# Patient Record
Sex: MALE | Race: BLACK OR AFRICAN AMERICAN | NOT HISPANIC OR LATINO | Employment: STUDENT | URBAN - METROPOLITAN AREA
[De-identification: names, ages, dates, MRNs, and addresses within clinical notes are randomized per-mention and may not be internally consistent; named-entity substitution may affect disease eponyms.]

---

## 2024-03-19 ENCOUNTER — HOSPITAL ENCOUNTER (EMERGENCY)
Facility: HOSPITAL | Age: 19
Discharge: HOME | End: 2024-03-19
Attending: STUDENT IN AN ORGANIZED HEALTH CARE EDUCATION/TRAINING PROGRAM
Payer: COMMERCIAL

## 2024-03-19 VITALS
TEMPERATURE: 100 F | WEIGHT: 172 LBS | HEART RATE: 102 BPM | DIASTOLIC BLOOD PRESSURE: 76 MMHG | RESPIRATION RATE: 16 BRPM | HEIGHT: 71 IN | SYSTOLIC BLOOD PRESSURE: 133 MMHG | OXYGEN SATURATION: 99 % | BODY MASS INDEX: 24.08 KG/M2

## 2024-03-19 DIAGNOSIS — J36 PERITONSILLAR ABSCESS: Primary | ICD-10-CM

## 2024-03-19 DIAGNOSIS — U07.1 COVID-19: ICD-10-CM

## 2024-03-19 LAB
ALBUMIN SERPL BCP-MCNC: 4.1 G/DL (ref 3.5–5.2)
ALP SERPL-CCNC: 60 U/L (ref 55–135)
ALT SERPL W/O P-5'-P-CCNC: 19 U/L (ref 10–44)
ANION GAP SERPL CALC-SCNC: 12 MMOL/L (ref 8–16)
AST SERPL-CCNC: 21 U/L (ref 10–40)
BASOPHILS # BLD AUTO: 0.03 K/UL (ref 0–0.2)
BASOPHILS NFR BLD: 0.3 % (ref 0–1.9)
BILIRUB SERPL-MCNC: 1.6 MG/DL (ref 0.1–1)
BUN SERPL-MCNC: 11 MG/DL (ref 6–20)
BUN SERPL-MCNC: 12 MG/DL (ref 6–30)
CALCIUM SERPL-MCNC: 10.4 MG/DL (ref 8.7–10.5)
CHLORIDE SERPL-SCNC: 102 MMOL/L (ref 95–110)
CHLORIDE SERPL-SCNC: 105 MMOL/L (ref 95–110)
CO2 SERPL-SCNC: 24 MMOL/L (ref 23–29)
CREAT SERPL-MCNC: 1.2 MG/DL (ref 0.5–1.4)
CREAT SERPL-MCNC: 1.2 MG/DL (ref 0.5–1.4)
CRP SERPL-MCNC: 79 MG/L (ref 0–8.2)
DIFFERENTIAL METHOD BLD: ABNORMAL
EOSINOPHIL # BLD AUTO: 0.1 K/UL (ref 0–0.5)
EOSINOPHIL NFR BLD: 1 % (ref 0–8)
ERYTHROCYTE [DISTWIDTH] IN BLOOD BY AUTOMATED COUNT: 12 % (ref 11.5–14.5)
EST. GFR  (NO RACE VARIABLE): >60 ML/MIN/1.73 M^2
GLUCOSE SERPL-MCNC: 83 MG/DL (ref 70–110)
GLUCOSE SERPL-MCNC: 87 MG/DL (ref 70–110)
GROUP A STREP, MOLECULAR: POSITIVE
HCT VFR BLD AUTO: 47 % (ref 40–54)
HCT VFR BLD CALC: 49 %PCV (ref 36–54)
HCV AB SERPL QL IA: NORMAL
HGB BLD-MCNC: 15.4 G/DL (ref 14–18)
HIV 1+2 AB+HIV1 P24 AG SERPL QL IA: NORMAL
IMM GRANULOCYTES # BLD AUTO: 0.04 K/UL (ref 0–0.04)
IMM GRANULOCYTES NFR BLD AUTO: 0.3 % (ref 0–0.5)
LYMPHOCYTES # BLD AUTO: 1.8 K/UL (ref 1–4.8)
LYMPHOCYTES NFR BLD: 15.6 % (ref 18–48)
MCH RBC QN AUTO: 30.2 PG (ref 27–31)
MCHC RBC AUTO-ENTMCNC: 32.8 G/DL (ref 32–36)
MCV RBC AUTO: 92 FL (ref 82–98)
MONOCYTES # BLD AUTO: 1.1 K/UL (ref 0.3–1)
MONOCYTES NFR BLD: 9.5 % (ref 4–15)
NEUTROPHILS # BLD AUTO: 8.5 K/UL (ref 1.8–7.7)
NEUTROPHILS NFR BLD: 73.3 % (ref 38–73)
NRBC BLD-RTO: 0 /100 WBC
PLATELET # BLD AUTO: 289 K/UL (ref 150–450)
PMV BLD AUTO: 8.7 FL (ref 9.2–12.9)
POC IONIZED CALCIUM: 1.21 MMOL/L (ref 1.06–1.42)
POC TCO2 (MEASURED): 29 MMOL/L (ref 23–29)
POTASSIUM BLD-SCNC: 4.6 MMOL/L (ref 3.5–5.1)
POTASSIUM SERPL-SCNC: 4.7 MMOL/L (ref 3.5–5.1)
PROT SERPL-MCNC: 8.7 G/DL (ref 6–8.4)
RBC # BLD AUTO: 5.1 M/UL (ref 4.6–6.2)
SAMPLE: NORMAL
SARS-COV-2 RDRP RESP QL NAA+PROBE: POSITIVE
SODIUM BLD-SCNC: 140 MMOL/L (ref 136–145)
SODIUM SERPL-SCNC: 141 MMOL/L (ref 136–145)
WBC # BLD AUTO: 11.51 K/UL (ref 3.9–12.7)

## 2024-03-19 PROCEDURE — 96365 THER/PROPH/DIAG IV INF INIT: CPT

## 2024-03-19 PROCEDURE — 87651 STREP A DNA AMP PROBE: CPT | Performed by: PHYSICIAN ASSISTANT

## 2024-03-19 PROCEDURE — 85025 COMPLETE CBC W/AUTO DIFF WBC: CPT | Performed by: PHYSICIAN ASSISTANT

## 2024-03-19 PROCEDURE — 25500020 PHARM REV CODE 255: Performed by: STUDENT IN AN ORGANIZED HEALTH CARE EDUCATION/TRAINING PROGRAM

## 2024-03-19 PROCEDURE — 96375 TX/PRO/DX INJ NEW DRUG ADDON: CPT

## 2024-03-19 PROCEDURE — U0002 COVID-19 LAB TEST NON-CDC: HCPCS | Performed by: PHYSICIAN ASSISTANT

## 2024-03-19 PROCEDURE — 87389 HIV-1 AG W/HIV-1&-2 AB AG IA: CPT | Performed by: PHYSICIAN ASSISTANT

## 2024-03-19 PROCEDURE — 86140 C-REACTIVE PROTEIN: CPT | Performed by: PHYSICIAN ASSISTANT

## 2024-03-19 PROCEDURE — 80053 COMPREHEN METABOLIC PANEL: CPT | Performed by: PHYSICIAN ASSISTANT

## 2024-03-19 PROCEDURE — 25000003 PHARM REV CODE 250: Performed by: STUDENT IN AN ORGANIZED HEALTH CARE EDUCATION/TRAINING PROGRAM

## 2024-03-19 PROCEDURE — 42700 I&D ABSCESS PERITONSILLAR: CPT

## 2024-03-19 PROCEDURE — 86803 HEPATITIS C AB TEST: CPT | Performed by: PHYSICIAN ASSISTANT

## 2024-03-19 PROCEDURE — 63600175 PHARM REV CODE 636 W HCPCS: Mod: JZ,JG | Performed by: PHYSICIAN ASSISTANT

## 2024-03-19 PROCEDURE — 99285 EMERGENCY DEPT VISIT HI MDM: CPT | Mod: 25

## 2024-03-19 PROCEDURE — 25000003 PHARM REV CODE 250: Performed by: PHYSICIAN ASSISTANT

## 2024-03-19 RX ORDER — CLINDAMYCIN PHOSPHATE 600 MG/50ML
600 INJECTION, SOLUTION INTRAVENOUS
Status: COMPLETED | OUTPATIENT
Start: 2024-03-19 | End: 2024-03-19

## 2024-03-19 RX ORDER — DEXAMETHASONE SODIUM PHOSPHATE 4 MG/ML
12 INJECTION, SOLUTION INTRA-ARTICULAR; INTRALESIONAL; INTRAMUSCULAR; INTRAVENOUS; SOFT TISSUE
Status: COMPLETED | OUTPATIENT
Start: 2024-03-19 | End: 2024-03-19

## 2024-03-19 RX ORDER — ACETAMINOPHEN 500 MG
1000 TABLET ORAL
Status: COMPLETED | OUTPATIENT
Start: 2024-03-19 | End: 2024-03-19

## 2024-03-19 RX ORDER — KETOROLAC TROMETHAMINE 30 MG/ML
10 INJECTION, SOLUTION INTRAMUSCULAR; INTRAVENOUS
Status: COMPLETED | OUTPATIENT
Start: 2024-03-19 | End: 2024-03-19

## 2024-03-19 RX ORDER — IBUPROFEN 600 MG/1
600 TABLET ORAL EVERY 6 HOURS PRN
Qty: 20 TABLET | Refills: 0 | Status: SHIPPED | OUTPATIENT
Start: 2024-03-19 | End: 2024-03-29

## 2024-03-19 RX ORDER — CLINDAMYCIN HYDROCHLORIDE 300 MG/1
300 CAPSULE ORAL 3 TIMES DAILY
Qty: 30 CAPSULE | Refills: 0 | Status: SHIPPED | OUTPATIENT
Start: 2024-03-19 | End: 2024-03-29

## 2024-03-19 RX ADMIN — KETOROLAC TROMETHAMINE 10 MG: 30 INJECTION, SOLUTION INTRAMUSCULAR; INTRAVENOUS at 07:03

## 2024-03-19 RX ADMIN — DEXAMETHASONE SODIUM PHOSPHATE 12 MG: 4 INJECTION INTRA-ARTICULAR; INTRALESIONAL; INTRAMUSCULAR; INTRAVENOUS; SOFT TISSUE at 07:03

## 2024-03-19 RX ADMIN — CLINDAMYCIN IN 5 PERCENT DEXTROSE 600 MG: 12 INJECTION, SOLUTION INTRAVENOUS at 08:03

## 2024-03-19 RX ADMIN — IOHEXOL 75 ML: 350 INJECTION, SOLUTION INTRAVENOUS at 07:03

## 2024-03-19 RX ADMIN — TOPICAL ANESTHETIC: 200 SPRAY DENTAL; PERIODONTAL at 09:03

## 2024-03-19 RX ADMIN — ACETAMINOPHEN 1000 MG: 500 TABLET ORAL at 10:03

## 2024-03-19 NOTE — ED PROVIDER NOTES
Encounter Date: 3/19/2024       History     Chief Complaint   Patient presents with    Oral Swelling     Pt. Was sent by urgent care for swollen tonsils.     18-year-old male presents emergency department for sore throat.  Five days of sore throat is progressively worsening.  States the pain is worse in the left side.  Seen by PCP and advised to come to the ED for for I&D.  Voice is slightly muffled with mild trismus.  Reports mild night sweats.        Review of patient's allergies indicates:  No Known Allergies  Past Medical History:   Diagnosis Date    RAD (reactive airway disease)      Past Surgical History:   Procedure Laterality Date    ADENOIDECTOMY       No family history on file.  Social History     Tobacco Use    Smoking status: Never   Substance Use Topics    Alcohol use: No    Drug use: No     Review of Systems   Constitutional:  Negative for chills.   HENT:  Positive for sore throat and trouble swallowing.    Respiratory:  Negative for cough and shortness of breath.    Cardiovascular:  Negative for chest pain.   Gastrointestinal:  Negative for abdominal pain.   Genitourinary:  Negative for difficulty urinating.   Musculoskeletal: Negative.    Neurological:  Negative for weakness.   Psychiatric/Behavioral:  Negative for confusion.        Physical Exam     Initial Vitals [03/19/24 1712]   BP Pulse Resp Temp SpO2   120/77 99 20 99.5 °F (37.5 °C) 95 %      MAP       --         Physical Exam    Nursing note and vitals reviewed.  Constitutional: He appears well-developed and well-nourished.   HENT:   Head: Normocephalic and atraumatic.   Uvula deviated to the left.  Bilateral tonsillar swelling worse in the left.  No exudate.  Voice slightly muffled.  Mild trismus.  No tongue elevation or sublingual swelling.   Eyes: Conjunctivae are normal.   Neck: Neck supple.   Normal range of motion.  Cardiovascular:  Normal rate.           Pulmonary/Chest: No respiratory distress.   Abdominal: He exhibits no distension.    Musculoskeletal:         General: Normal range of motion.      Cervical back: Normal range of motion and neck supple.     Neurological: He is alert and oriented to person, place, and time. GCS score is 15. GCS eye subscore is 4. GCS verbal subscore is 5. GCS motor subscore is 6.   Skin: Skin is warm and dry. Capillary refill takes less than 2 seconds.         ED Course   Procedures  Labs Reviewed   GROUP A STREP, MOLECULAR - Abnormal; Notable for the following components:       Result Value    Group A Strep, Molecular Positive (*)     All other components within normal limits   CBC W/ AUTO DIFFERENTIAL - Abnormal; Notable for the following components:    MPV 8.7 (*)     Gran # (ANC) 8.5 (*)     Mono # 1.1 (*)     Gran % 73.3 (*)     Lymph % 15.6 (*)     All other components within normal limits   COMPREHENSIVE METABOLIC PANEL - Abnormal; Notable for the following components:    Total Protein 8.7 (*)     Total Bilirubin 1.6 (*)     All other components within normal limits   C-REACTIVE PROTEIN - Abnormal; Notable for the following components:    CRP 79.0 (*)     All other components within normal limits   SARS-COV-2 RNA AMPLIFICATION, QUAL - Abnormal; Notable for the following components:    SARS-CoV-2 RNA, Amplification, Qual Positive (*)     All other components within normal limits   HIV 1 / 2 ANTIBODY    Narrative:     Release to patient->Immediate   HEPATITIS C ANTIBODY    Narrative:     Release to patient->Immediate   ISTAT PROCEDURE   ISTAT CHEM8          Imaging Results               CT Soft Tissue Neck With Contrast (Final result)  Result time 03/19/24 20:09:10      Final result by Luis A Carlin MD (03/19/24 20:09:10)                   Impression:      Tonsillitis with associated left peritonsillar abscess as above.  ENT consultation recommended.    This report was flagged in Epic as abnormal.    Electronically signed by resident: Jah Myers  Date:    03/19/2024  Time:    19:41    Electronically signed  by: Luis A Carlin MD  Date:    03/19/2024  Time:    20:09               Narrative:    EXAMINATION:  CT SOFT TISSUE NECK WITH CONTRAST    CLINICAL HISTORY:  Neck abscess, deep tissue;    TECHNIQUE:  Low dose axial images as well as sagittal and coronal reconstructions were performed from the skull base to the clavicles following the intravenous administration of 75 mL of Omnipaque 350.    COMPARISON:  None    FINDINGS:  Skull Base and Brain (limited evaluation): No significant abnormality.    Sinuses and Mastoid Air Cells: Essentially clear.    Salivary Glands: Parotid and submandibular glands unremarkable.    Thyroid: Unremarkable.    Cervical Lymph Nodes: Prominent left cervical lymph nodes, likely reactive.    Pharynx/Larynx: Mild enlargement of the bilateral palatine tonsils, left more than right with some narrowing of the oropharynx.  Mild enlargement of the left adenoids and slight enlargement of the left more than right lingual tonsils.  Left peritonsillar abscess measuring 1.0 x 2.0 x 1.6 cm with at least 1 internal septation/loculation (series 2, image 51 and series 603, image 76).  Mild fat stranding in the left parapharyngeal space.  No retropharyngeal fluid or abscess.    Vasculature (limited evaluation): Vascular structures of the neck appear patent.  Closest distance between the peritonsillar abscess and left internal carotid artery measures 1.8 cm.    Upper Airways and Lungs: Trachea is midline and the proximal airways are patent.  Ill-defined ground-glass opacities in the right upper lobe, likely infectious or inflammatory.    Bones: No acute fracture suspicious osseous lesion.                                       Medications   benzocaine 20 % oral spray (has no administration in time range)   acetaminophen tablet 1,000 mg (has no administration in time range)   ketorolac injection 9.999 mg (9.999 mg Intravenous Given 3/19/24 1948)   dexAMETHasone injection 12 mg (12 mg Intravenous Given 3/19/24 1948)    clindamycin in D5W 600 mg/50 mL IVPB 600 mg (0 mg Intravenous Stopped 3/19/24 2040)   iohexoL (OMNIPAQUE 350) injection 75 mL (75 mLs Intravenous Given 3/19/24 1929)     Medical Decision Making  [age] old male who was referred to the ED for sore throat and concern for abscess.      Differential includes but not limited to PTA, retropharyngeal abscess, Champ's angina, pharyngitis, COVID-19    On exam he is mild muffled voice with minimal trismus and uvula deviation.  CT soft tissue neck was obtained which shows a small PTA.  Additionally his viral screening was positive for COVID-19 and his strep swab was positive for group a strep.  Given Decadron and clinda in the ED and ENT was consulted.  I and D at bedside with small amount of drainage per ENT and they recommend discharge home with clindamycin.  We discussed quarantine precautions for his COVID-19.  Return ED precautions given.    Amount and/or Complexity of Data Reviewed  Labs: ordered. Decision-making details documented in ED Course.  Radiology: ordered.    Risk  Prescription drug management.               ED Course as of 03/19/24 2234   Tue Mar 19, 2024   1915 Group A Strep, Molecular(!): Positive [HJ]   1915 WBC: 11.51  No leukocytosis [HJ]   1933 SARS-CoV-2 RNA, Amplification, Qual(!): Positive  Positive for COVID-19 [HJ]   1946 CRP(!): 79.0  Elevated.  No baseline for comparison [HJ]      ED Course User Index  [HJ] Carolina Franco PA-C                           Clinical Impression:  Final diagnoses:  [J36] Peritonsillar abscess (Primary)  [U07.1] COVID-19          ED Disposition Condition    Discharge Stable          ED Prescriptions       Medication Sig Dispense Start Date End Date Auth. Provider    clindamycin (CLEOCIN) 300 MG capsule Take 1 capsule (300 mg total) by mouth 3 (three) times daily. for 10 days 30 capsule 3/19/2024 3/29/2024 Carolina Franco PA-C    ibuprofen (ADVIL,MOTRIN) 600 MG tablet Take 1 tablet (600 mg total) by mouth every 6 (six) hours  as needed for Pain. 20 tablet 3/19/2024 3/29/2024 Carolina Franco PA-C          Follow-up Information       Follow up With Specialties Details Why Contact Info    Sourav Fletcher MD Otolaryngology Schedule an appointment as soon as possible for a visit in 2 month(s) To discuss tonsillectomy [address] [facility] [address]  [phone]               Carolina Franco PA-C  03/19/24 2237

## 2024-03-19 NOTE — ED TRIAGE NOTES
Luis A Vergara, a 18 y.o. male presents to the ED w/ complaint of swollen tonsils, difficulty swallowing and HA.    Triage note:  Chief Complaint   Patient presents with    Oral Swelling     Pt. Was sent by urgent care for swollen tonsils.     Review of patient's allergies indicates:  No Known Allergies  Past Medical History:   Diagnosis Date    RAD (reactive airway disease)

## 2024-03-20 NOTE — CONSULTS
[facility] - Emergency Dept  Otorhinolaryngology-Head & Neck Surgery  Consult Note    Patient Name: Luis A Vergara  MRN: [id number]  Code Status: No Order  Admission Date: 3/19/2024  Hospital Length of Stay: 0 days  Attending Physician: Octavia Hemphill MD  Primary Care Provider: Stephanie Reinoso MD    Patient information was obtained from patient, parent, and ER records.     Inpatient consult to ENT  Consult performed by: Wilmer Walden MD  Consult ordered by: Carolina Franco PA-C        Subjective:     Chief Complaint/Reason for Admission: PTA    History of Present Illness: Luis A Vergara is 18 y.o. male that presents with a sore throat. Symptoms began 4 days ago now the pain is moderate. The pain is primarily on (the) left side. Patient states fever is absent. There is a  complaint of trismus. The voice is muffled.  Other associated symptoms have included: none. Fluid intake is fair. All his symptoms have significantly improved with decadron and clinda. Pt does report prior similar infections in the past.       Medications:  Continuous Infusions:  Scheduled Meds:   acetaminophen  1,000 mg Oral ED 1 Time    benzocaine   Mouth/Throat Once     PRN Meds:     No current facility-administered medications on file prior to encounter.     No current outpatient medications on file prior to encounter.       Review of patient's allergies indicates:  No Known Allergies    Past Medical History:   Diagnosis Date    RAD (reactive airway disease)      Past Surgical History:   Procedure Laterality Date    ADENOIDECTOMY       Family History    None       Tobacco Use    Smoking status: Never    Smokeless tobacco: Not on file   Substance and Sexual Activity    Alcohol use: No    Drug use: No    Sexual activity: Not on file     Review of Systems as above  Objective:     Vital Signs (Most Recent):  Temp: (!) 100.4 °F (38 °C) (03/19/24 2028)  Pulse: 102 (03/19/24 2028)  Resp: 16 (03/19/24 2028)  BP: 133/76 (03/19/24 2028)  SpO2: 99 %  (03/19/24 2028) Vital Signs (24h Range):  Temp:  [99.5 °F (37.5 °C)-100.4 °F (38 °C)] 100.4 °F (38 °C)  Pulse:  [] 102  Resp:  [16-20] 16  SpO2:  [95 %-99 %] 99 %  BP: (120-133)/(76-77) 133/76     Weight: 78 kg (172 lb)  Body mass index is 23.99 kg/m².         Physical Exam     NAD  Awake and alert  Normal WOB, no stridor or stertor on room air   MMM, anterior tongue mobile, FOM soft  OP patent, uvula midline  4+ , asymmetric, L>R, and redwith erythema of soft palate, no significant edema of palate or pillar  Neck soft, mildly TTP diffusely, normal ROM     Procedure Note: Incision and Drainage of Peritonsillar Abscess  After informed consent was obtained, the left superior aspect of the anterior tonsillar pillar was anesthetized with benzocaine spray and 4 cc 1% lidocaine with epinephrine. The affected area was then initially drained using needle aspiration, with return of purulence material - around 1.5cc, which was sent for culture. Next, the mucosa was incised with an 11 blade and the peritonsillar abscess pocket was cleanly entered. It was spread with hemostats but more pus return. Finally, the wound was copiously irrigated with normal saline. The patient tolerated the procedure well, and was without apparent complication.    Labs:  Recent Labs   Lab 03/19/24  1857 03/19/24  1906   WBC 11.51  --    RBC 5.10  --    HGB 15.4  --    HCT 47.0 49     --    MCV 92  --    MCH 30.2  --    MCHC 32.8  --          Imaging:  CT Soft Tissue Neck With Contrast    Result Date: 3/19/2024  Tonsillitis with associated left peritonsillar abscess as above.  ENT consultation recommended. This report was flagged in Epic as abnormal. Electronically signed by resident: Jah Myers Date:    03/19/2024 Time:    19:41 Electronically signed by: Luis A Carlin MD Date:    03/19/2024 Time:    20:09       Assessment/Plan:     Peritonsillar abscess  18M with history of multiple tonsil infections presents with small left PTA.  Appeared to have decreased in size after IV steroids and clinda compared to scan. Small return of pus from peritonsillar space. Patient reports significant improvement.     -ok for discharge  -clinda x10d  -return precautions discussed  -info given to schedule appt to discuss tonsillectomy      VTE Risk Mitigation (From admission, onward)      None            Thank you for your consult. I will sign off. Please contact us if you have any additional questions.    Wilmer Walden MD  Otorhinolaryngology-Head & Neck Surgery  [facility] - Emergency Dept

## 2024-03-20 NOTE — SUBJECTIVE & OBJECTIVE
Medications:  Continuous Infusions:  Scheduled Meds:   acetaminophen  1,000 mg Oral ED 1 Time    benzocaine   Mouth/Throat Once     PRN Meds:     No current facility-administered medications on file prior to encounter.     No current outpatient medications on file prior to encounter.       Review of patient's allergies indicates:  No Known Allergies    Past Medical History:   Diagnosis Date    RAD (reactive airway disease)      Past Surgical History:   Procedure Laterality Date    ADENOIDECTOMY       Family History    None       Tobacco Use    Smoking status: Never    Smokeless tobacco: Not on file   Substance and Sexual Activity    Alcohol use: No    Drug use: No    Sexual activity: Not on file     Review of Systems as above  Objective:     Vital Signs (Most Recent):  Temp: (!) 100.4 °F (38 °C) (03/19/24 2028)  Pulse: 102 (03/19/24 2028)  Resp: 16 (03/19/24 2028)  BP: 133/76 (03/19/24 2028)  SpO2: 99 % (03/19/24 2028) Vital Signs (24h Range):  Temp:  [99.5 °F (37.5 °C)-100.4 °F (38 °C)] 100.4 °F (38 °C)  Pulse:  [] 102  Resp:  [16-20] 16  SpO2:  [95 %-99 %] 99 %  BP: (120-133)/(76-77) 133/76     Weight: 78 kg (172 lb)  Body mass index is 23.99 kg/m².         Physical Exam     NAD  Awake and alert  Normal WOB, no stridor or stertor on room air   MMM, anterior tongue mobile, FOM soft  OP patent, uvula midline  4+ , asymmetric, L>R, and redwith erythema of soft palate, no significant edema of palate or pillar  Neck soft, mildly TTP diffusely, normal ROM     Procedure Note: Incision and Drainage of Peritonsillar Abscess  After informed consent was obtained, the left superior aspect of the anterior tonsillar pillar was anesthetized with benzocaine spray and 4 cc 1% lidocaine with epinephrine. The affected area was then initially drained using needle aspiration, with return of purulence material - around 1.5cc, which was sent for culture. Next, the mucosa was incised with an 11 blade and the peritonsillar abscess  pocket was cleanly entered. It was spread with hemostats but more pus return. Finally, the wound was copiously irrigated with normal saline. The patient tolerated the procedure well, and was without apparent complication.    Labs:  Recent Labs   Lab 03/19/24 1857 03/19/24  1906   WBC 11.51  --    RBC 5.10  --    HGB 15.4  --    HCT 47.0 49     --    MCV 92  --    MCH 30.2  --    MCHC 32.8  --          Imaging:  CT Soft Tissue Neck With Contrast    Result Date: 3/19/2024  Tonsillitis with associated left peritonsillar abscess as above.  ENT consultation recommended. This report was flagged in Epic as abnormal. Electronically signed by resident: Jah Myers Date:    03/19/2024 Time:    19:41 Electronically signed by: Luis A Carlin MD Date:    03/19/2024 Time:    20:09

## 2024-03-20 NOTE — ASSESSMENT & PLAN NOTE
18M with history of multiple tonsil infections presents with small left PTA. Appeared to have decreased in size after IV steroids and clinda compared to scan. Small return of pus from peritonsillar space. Patient reports significant improvement.     -ok for discharge  -clinda x10d  -return precautions discussed  -info given to schedule appt to discuss tonsillectomy

## 2024-03-20 NOTE — HPI
Luis A Vergara is 18 y.o. male that presents with a sore throat. Symptoms began 4 days ago now the pain is moderate. The pain is primarily on (the) left side. Patient states fever is absent. There is a  complaint of trismus. The voice is muffled.  Other associated symptoms have included: none. Fluid intake is fair. All his symptoms have significantly improved with decadron and clinda. Pt does report prior similar infections in the past.

## 2024-03-20 NOTE — ED NOTES
I-STAT Chem-8+ Results:   Value Reference Range   Sodium 140 136-145 mmol/L   Potassium  4.6 3.5-5.1 mmol/L   Chloride 102  mmol/L   Ionized Calcium 1.21 1.06-1.42 mmol/L   CO2 (measured) 29 23-29 mmol/L   Glucose 87  mg/dL   BUN 12 6-30 mg/dL   Creatinine 1.2 0.5-1.4 mg/dL   Hematocrit 49 36-54%

## 2024-06-10 ENCOUNTER — OFFICE VISIT (OUTPATIENT)
Dept: INTERNAL MEDICINE | Facility: CLINIC | Age: 19
End: 2024-06-10

## 2024-06-10 VITALS
DIASTOLIC BLOOD PRESSURE: 80 MMHG | OXYGEN SATURATION: 98 % | WEIGHT: 172.81 LBS | HEIGHT: 71 IN | SYSTOLIC BLOOD PRESSURE: 122 MMHG | HEART RATE: 84 BPM | BODY MASS INDEX: 24.19 KG/M2

## 2024-06-10 DIAGNOSIS — J02.0 STREP PHARYNGITIS: Primary | ICD-10-CM

## 2024-06-10 LAB
CTP QC/QA: YES
S PYO RRNA THROAT QL PROBE: POSITIVE

## 2024-06-10 PROCEDURE — 87880 STREP A ASSAY W/OPTIC: CPT | Mod: PBBFAC | Performed by: FAMILY MEDICINE

## 2024-06-10 PROCEDURE — 99999 PR PBB SHADOW E&M-EST. PATIENT-LVL III: CPT | Mod: PBBFAC,,, | Performed by: FAMILY MEDICINE

## 2024-06-10 PROCEDURE — 99213 OFFICE O/P EST LOW 20 MIN: CPT | Mod: PBBFAC | Performed by: FAMILY MEDICINE

## 2024-06-10 PROCEDURE — 99203 OFFICE O/P NEW LOW 30 MIN: CPT | Mod: S$PBB,,, | Performed by: FAMILY MEDICINE

## 2024-06-10 PROCEDURE — 99999PBSHW POCT RAPID STREP A: Mod: PBBFAC,,,

## 2024-06-10 RX ORDER — AMOXICILLIN AND CLAVULANATE POTASSIUM 875; 125 MG/1; MG/1
1 TABLET, FILM COATED ORAL EVERY 12 HOURS
Qty: 20 TABLET | Refills: 0 | Status: SHIPPED | OUTPATIENT
Start: 2024-06-10 | End: 2024-06-20

## 2024-06-10 NOTE — PROGRESS NOTES
"Subjective:     Patient ID: Luis A Vergara is a 18 y.o. male.   Chief Complaint: No chief complaint on file.    HPI:  Patient presents today for sore throat.      [facility] on 03/19/2024 for sore throat.  Workup included CT imaging.  This demonstrated a left peritonsillar abscess.  He was also noted to be strep positive and COVID positive.  I&D was performed at bedside and he was prescribed clindamycin.    Current symptoms started yesterday evening.  He reports he had subjective fevers last night.  Says that the symptoms he is experiencing today a much less severe than what he experienced back in March.  He is able to swallow without difficulty.  He is able to speak normally.      Review of Systems   Constitutional:  Positive for fatigue and fever. Negative for chills.   HENT:  Positive for sore throat. Negative for nasal congestion, drooling, ear pain, postnasal drip, rhinorrhea, sinus pressure/congestion, sneezing, trouble swallowing and voice change.    Respiratory:  Negative for cough, shortness of breath and wheezing.    Cardiovascular:  Negative for chest pain.   Gastrointestinal:  Negative for abdominal pain, diarrhea and nausea.          Objective:      Vitals:    06/10/24 1516   BP: 122/80   BP Location: Left arm   Patient Position: Sitting   BP Method: Medium (Manual)   Pulse: 84   SpO2: 98%   Weight: 78.4 kg (172 lb 13.5 oz)   Height: 5' 11" (1.803 m)      Physical Exam  Constitutional:       General: He is not in acute distress.     Appearance: He is normal weight. He is ill-appearing.   HENT:      Head: Normocephalic and atraumatic.      Jaw: No trismus.      Right Ear: Hearing, tympanic membrane, ear canal and external ear normal. No drainage. No middle ear effusion. Tympanic membrane is not bulging.      Left Ear: Hearing, tympanic membrane, ear canal and external ear normal. No drainage.  No middle ear effusion. Tympanic membrane is not bulging.      Nose: Nose normal.      " Mouth/Throat:      Mouth: Mucous membranes are moist.      Tongue: No lesions. Tongue does not deviate from midline.      Palate: No mass and lesions.      Pharynx: Uvula midline. Pharyngeal swelling and posterior oropharyngeal erythema present. No oropharyngeal exudate or uvula swelling.      Tonsils: Tonsillar exudate present. No tonsillar abscesses. 3+ on the right. 4+ on the left.   Cardiovascular:      Rate and Rhythm: Normal rate and regular rhythm.      Pulses: Normal pulses.      Heart sounds: Normal heart sounds. No murmur heard.     No friction rub. No gallop.   Pulmonary:      Effort: Pulmonary effort is normal. No respiratory distress.      Breath sounds: Normal breath sounds. No stridor. No wheezing, rhonchi or rales.   Abdominal:      General: Abdomen is flat. There is no distension.      Palpations: Abdomen is soft. There is no mass.      Tenderness: There is no abdominal tenderness. There is no guarding.      Hernia: No hernia is present.   Neurological:      General: No focal deficit present.      Mental Status: He is alert and oriented to person, place, and time. Mental status is at baseline.   Psychiatric:         Mood and Affect: Mood normal.         Behavior: Behavior normal.         Thought Content: Thought content normal.         Judgment: Judgment normal.           Assessment:       Problem List Items Addressed This Visit    None  Visit Diagnoses       Strep pharyngitis    -  Primary    Relevant Medications    amoxicillin-clavulanate 875-125mg (AUGMENTIN) 875-125 mg per tablet    Other Relevant Orders    POCT Rapid Strep A (Completed)    POCT Influenza A/B Molecular    POCT COVID-19 Rapid Screening              Plan:       1. Strep pharyngitis  Rapid strep positive  Flu negative, COVID negative   No evidence on exam for developing PTA  Prescribe Augmentin b.i.d. 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 for 10 days  Dispense: 20 tablet; Refill: 0          Follow up if symptoms worsen or fail to improve.     Jin Villarreal MD, FAAFP  Family Medicine Physician  [facility]  06/10/2024      This note was produced using voice recognition technology. Some typographical or syntax errors may be present, despite best efforts with proofreading.             13-Sep-2020 13:12

## 2024-09-09 ENCOUNTER — OFFICE VISIT (OUTPATIENT)
Dept: OTOLARYNGOLOGY | Facility: CLINIC | Age: 19
End: 2024-09-09
Payer: COMMERCIAL

## 2024-09-09 VITALS — BODY MASS INDEX: 24.28 KG/M2 | WEIGHT: 174.06 LBS

## 2024-09-09 DIAGNOSIS — J36 PERITONSILLAR ABSCESS: ICD-10-CM

## 2024-09-09 DIAGNOSIS — J03.91 RECURRENT TONSILLITIS: Primary | ICD-10-CM

## 2024-09-09 NOTE — PROGRESS NOTES
Subjective     Patient ID: Luis A Vergara is a 18 y.o. male.    Chief Complaint: Sore Throat    HPI    Luis A is a 18 y.o. male with a 1 year history of recurrent tonsillitis.   When ill, the patient has moderate pain. Associated signs / symptoms are fever, cervical nodes, swelling/exudate, tonsil hypertrophy. The patient has had 3-4 acute episodes in the last  1 year. One PTA episode in 2024.     Most recent tonsillitis episode starts one week ago. Wake up with sore throat. Gets better through out the day.    She does not have problems with tonsillith formation and expectoration. She does not have problems with halitosis.     The patient does have large tonsils. The patient does not have problems with snoring and sleep disturbance . The patient has missed 0 days of school in the last 12 months due to this problem.     The patient has been Strep positive 1 times . The patient has been treated with the following antibiotics : Amoxicillin, Augmentin, Clindamycin .      Review of Systems   Constitutional:  Negative for chills, fever and unexpected weight change.   HENT:  Positive for sore throat. Negative for facial swelling and hearing loss.    Eyes:  Negative for visual disturbance.   Respiratory:  Negative for wheezing and stridor.    Cardiovascular:         Neg for CHD   Gastrointestinal: Negative.  Negative for nausea and vomiting.   Genitourinary: Negative.         Neg for congenital abn   Musculoskeletal:  Negative for arthralgias and myalgias.   Integumentary:  Negative.   Neurological:  Negative for seizures, speech difficulty and weakness.   Hematological:  Negative for adenopathy. Does not bruise/bleed easily.   Psychiatric/Behavioral:  Negative for behavioral problems.      (Peds Addendum)    PMH: Gestation/: Term, well child            G&D: Nl             Med/Surg/Accidents:    See ROS                                                  CV: no congenital abn                                                     Pulm: no asthma, no chronic diseases                                                       FH:  Bleeding disorders:                         none         MH/anesthetic problems:                 none                  Sickle Cell:                                      none         OM/HL:                                           none         Allergy/Asthma:                              none    SH:  Nursery/School:                                5 d/wk          Tobacco Exposure:                             0             Objective     Physical Exam  Constitutional:       Appearance: He is well-developed.   HENT:      Head: Normocephalic.      Right Ear: Tympanic membrane and external ear normal. No middle ear effusion.      Left Ear: Tympanic membrane and external ear normal.  No middle ear effusion.      Nose: Nose normal. No nasal deformity.      Mouth/Throat:      Tonsils: 3+ on the right. 3+ on the left.   Eyes:      General: Lids are normal.      Conjunctiva/sclera: Conjunctivae normal.      Pupils: Pupils are equal, round, and reactive to light.   Neck:      Thyroid: No thyroid mass.      Trachea: Trachea normal.   Cardiovascular:      Rate and Rhythm: Normal rate and regular rhythm.   Pulmonary:      Effort: Pulmonary effort is normal. No respiratory distress.   Musculoskeletal:         General: Normal range of motion.   Lymphadenopathy:      Cervical: No cervical adenopathy.   Skin:     General: Skin is warm.      Findings: No rash.   Neurological:      Mental Status: He is alert and oriented to person, place, and time.      Cranial Nerves: No cranial nerve deficit.   Psychiatric:         Behavior: Behavior normal.            Assessment and Plan     1. Recurrent tonsillitis    2. History of peritonsillar abscess  -     Ambulatory referral/consult to ENT        PLAN:  Indications for tonsillectomy include: 7 episodes of tonsillitis in 1 year, 5 episodes a year for 2 years consecutively, 3 episodes a  year for 3 or more years consecutively, or sleep disordered breathing / obstructive sleep apnea in the setting of adenotonsillar hypertrophy. At this time patient does not meet criteria  Tylenol and motrin for sore throat. Monitor sxs at home. RTC if symptoms worsens  Consult requested by:  Stephanie Reinoso MD           No follow-ups on file.